# Patient Record
Sex: MALE | Race: WHITE | Employment: FULL TIME | URBAN - METROPOLITAN AREA
[De-identification: names, ages, dates, MRNs, and addresses within clinical notes are randomized per-mention and may not be internally consistent; named-entity substitution may affect disease eponyms.]

---

## 2017-11-17 ENCOUNTER — APPOINTMENT (OUTPATIENT)
Dept: CT IMAGING | Age: 57
DRG: 200 | End: 2017-11-17
Attending: EMERGENCY MEDICINE
Payer: COMMERCIAL

## 2017-11-17 ENCOUNTER — HOSPITAL ENCOUNTER (INPATIENT)
Age: 57
LOS: 3 days | Discharge: HOME | DRG: 200 | End: 2017-11-20
Attending: EMERGENCY MEDICINE | Admitting: HOSPITALIST
Payer: COMMERCIAL

## 2017-11-17 ENCOUNTER — APPOINTMENT (OUTPATIENT)
Dept: GENERAL RADIOLOGY | Age: 57
DRG: 200 | End: 2017-11-17
Attending: EMERGENCY MEDICINE
Payer: COMMERCIAL

## 2017-11-17 DIAGNOSIS — J94.2 HEMOPNEUMOTHORAX ON RIGHT: Primary | ICD-10-CM

## 2017-11-17 DIAGNOSIS — S22.41XA CLOSED FRACTURE OF MULTIPLE RIBS OF RIGHT SIDE, INITIAL ENCOUNTER: ICD-10-CM

## 2017-11-17 PROBLEM — S27.2XXA TRAUMATIC HEMOPNEUMOTHORAX: Status: ACTIVE | Noted: 2017-11-17

## 2017-11-17 LAB
ALBUMIN SERPL-MCNC: 3.4 G/DL (ref 3.5–5)
ALBUMIN/GLOB SERPL: 0.8 {RATIO} (ref 1.1–2.2)
ALP SERPL-CCNC: 65 U/L (ref 45–117)
ALT SERPL-CCNC: 36 U/L (ref 12–78)
ANION GAP SERPL CALC-SCNC: 8 MMOL/L (ref 5–15)
APPEARANCE UR: CLEAR
AST SERPL-CCNC: 33 U/L (ref 15–37)
BACTERIA URNS QL MICRO: NEGATIVE /HPF
BASOPHILS # BLD: 0 K/UL (ref 0–0.1)
BASOPHILS NFR BLD: 0 % (ref 0–1)
BILIRUB SERPL-MCNC: 1.5 MG/DL (ref 0.2–1)
BILIRUB UR QL CFM: NEGATIVE
BUN SERPL-MCNC: 9 MG/DL (ref 6–20)
BUN/CREAT SERPL: 14 (ref 12–20)
CALCIUM SERPL-MCNC: 8.5 MG/DL (ref 8.5–10.1)
CHLORIDE SERPL-SCNC: 101 MMOL/L (ref 97–108)
CO2 SERPL-SCNC: 27 MMOL/L (ref 21–32)
COLOR UR: ABNORMAL
CREAT SERPL-MCNC: 0.65 MG/DL (ref 0.7–1.3)
EOSINOPHIL # BLD: 0.1 K/UL (ref 0–0.4)
EOSINOPHIL NFR BLD: 1 % (ref 0–7)
EPITH CASTS URNS QL MICRO: ABNORMAL /LPF
ERYTHROCYTE [DISTWIDTH] IN BLOOD BY AUTOMATED COUNT: 13.5 % (ref 11.5–14.5)
GLOBULIN SER CALC-MCNC: 4.3 G/DL (ref 2–4)
GLUCOSE SERPL-MCNC: 100 MG/DL (ref 65–100)
GLUCOSE UR STRIP.AUTO-MCNC: NEGATIVE MG/DL
HCT VFR BLD AUTO: 41.5 % (ref 36.6–50.3)
HGB BLD-MCNC: 13.7 G/DL (ref 12.1–17)
HGB UR QL STRIP: NEGATIVE
INR PPP: 1 (ref 0.9–1.1)
KETONES UR QL STRIP.AUTO: 15 MG/DL
LEUKOCYTE ESTERASE UR QL STRIP.AUTO: ABNORMAL
LIPASE SERPL-CCNC: 116 U/L (ref 73–393)
LYMPHOCYTES # BLD: 1.5 K/UL (ref 0.8–3.5)
LYMPHOCYTES NFR BLD: 12 % (ref 12–49)
MCH RBC QN AUTO: 30.4 PG (ref 26–34)
MCHC RBC AUTO-ENTMCNC: 33 G/DL (ref 30–36.5)
MCV RBC AUTO: 92 FL (ref 80–99)
MONOCYTES # BLD: 1.5 K/UL (ref 0–1)
MONOCYTES NFR BLD: 12 % (ref 5–13)
MUCOUS THREADS URNS QL MICRO: ABNORMAL /LPF
NEUTS SEG # BLD: 9.3 K/UL (ref 1.8–8)
NEUTS SEG NFR BLD: 75 % (ref 32–75)
NITRITE UR QL STRIP.AUTO: NEGATIVE
PH UR STRIP: 6 [PH] (ref 5–8)
PLATELET # BLD AUTO: 278 K/UL (ref 150–400)
POTASSIUM SERPL-SCNC: 3.8 MMOL/L (ref 3.5–5.1)
PROT SERPL-MCNC: 7.7 G/DL (ref 6.4–8.2)
PROT UR STRIP-MCNC: ABNORMAL MG/DL
PROTHROMBIN TIME: 9.8 SEC (ref 9–11.1)
RBC # BLD AUTO: 4.51 M/UL (ref 4.1–5.7)
RBC #/AREA URNS HPF: ABNORMAL /HPF (ref 0–5)
SODIUM SERPL-SCNC: 136 MMOL/L (ref 136–145)
SP GR UR REFRACTOMETRY: 1.03 (ref 1–1.03)
UROBILINOGEN UR QL STRIP.AUTO: 1 EU/DL (ref 0.2–1)
WBC # BLD AUTO: 12.4 K/UL (ref 4.1–11.1)
WBC URNS QL MICRO: ABNORMAL /HPF (ref 0–4)

## 2017-11-17 PROCEDURE — 99284 EMERGENCY DEPT VISIT MOD MDM: CPT

## 2017-11-17 PROCEDURE — 74011636320 HC RX REV CODE- 636/320: Performed by: EMERGENCY MEDICINE

## 2017-11-17 PROCEDURE — 96361 HYDRATE IV INFUSION ADD-ON: CPT

## 2017-11-17 PROCEDURE — 77030019597 HC DRN CHST PLUER TELE -B

## 2017-11-17 PROCEDURE — 74011250636 HC RX REV CODE- 250/636: Performed by: EMERGENCY MEDICINE

## 2017-11-17 PROCEDURE — 74011000258 HC RX REV CODE- 258: Performed by: EMERGENCY MEDICINE

## 2017-11-17 PROCEDURE — 85025 COMPLETE CBC W/AUTO DIFF WBC: CPT | Performed by: EMERGENCY MEDICINE

## 2017-11-17 PROCEDURE — 36415 COLL VENOUS BLD VENIPUNCTURE: CPT | Performed by: EMERGENCY MEDICINE

## 2017-11-17 PROCEDURE — 96374 THER/PROPH/DIAG INJ IV PUSH: CPT

## 2017-11-17 PROCEDURE — 74011250636 HC RX REV CODE- 250/636

## 2017-11-17 PROCEDURE — 99153 MOD SED SAME PHYS/QHP EA: CPT

## 2017-11-17 PROCEDURE — 74177 CT ABD & PELVIS W/CONTRAST: CPT

## 2017-11-17 PROCEDURE — 99152 MOD SED SAME PHYS/QHP 5/>YRS: CPT

## 2017-11-17 PROCEDURE — 94761 N-INVAS EAR/PLS OXIMETRY MLT: CPT

## 2017-11-17 PROCEDURE — 83690 ASSAY OF LIPASE: CPT | Performed by: EMERGENCY MEDICINE

## 2017-11-17 PROCEDURE — 96375 TX/PRO/DX INJ NEW DRUG ADDON: CPT

## 2017-11-17 PROCEDURE — 80053 COMPREHEN METABOLIC PANEL: CPT | Performed by: EMERGENCY MEDICINE

## 2017-11-17 PROCEDURE — 85610 PROTHROMBIN TIME: CPT | Performed by: EMERGENCY MEDICINE

## 2017-11-17 PROCEDURE — 0W9930Z DRAINAGE OF RIGHT PLEURAL CAVITY WITH DRAINAGE DEVICE, PERCUTANEOUS APPROACH: ICD-10-PCS | Performed by: EMERGENCY MEDICINE

## 2017-11-17 PROCEDURE — 81001 URINALYSIS AUTO W/SCOPE: CPT | Performed by: EMERGENCY MEDICINE

## 2017-11-17 PROCEDURE — 74011000250 HC RX REV CODE- 250: Performed by: EMERGENCY MEDICINE

## 2017-11-17 PROCEDURE — C1729 CATH, DRAINAGE: HCPCS

## 2017-11-17 PROCEDURE — 71010 XR CHEST PORT: CPT

## 2017-11-17 PROCEDURE — 71260 CT THORAX DX C+: CPT

## 2017-11-17 PROCEDURE — 74011250637 HC RX REV CODE- 250/637: Performed by: HOSPITALIST

## 2017-11-17 PROCEDURE — 65270000032 HC RM SEMIPRIVATE

## 2017-11-17 PROCEDURE — 74011250636 HC RX REV CODE- 250/636: Performed by: HOSPITALIST

## 2017-11-17 PROCEDURE — 75810000165 HC THORACENTESIS

## 2017-11-17 RX ORDER — HYDROMORPHONE HYDROCHLORIDE 1 MG/ML
1 INJECTION, SOLUTION INTRAMUSCULAR; INTRAVENOUS; SUBCUTANEOUS
Status: COMPLETED | OUTPATIENT
Start: 2017-11-17 | End: 2017-11-17

## 2017-11-17 RX ORDER — DIPHENHYDRAMINE HCL 25 MG
25 CAPSULE ORAL
Status: DISCONTINUED | OUTPATIENT
Start: 2017-11-17 | End: 2017-11-20 | Stop reason: HOSPADM

## 2017-11-17 RX ORDER — ACETAMINOPHEN 325 MG/1
650 TABLET ORAL
Status: DISCONTINUED | OUTPATIENT
Start: 2017-11-17 | End: 2017-11-20 | Stop reason: HOSPADM

## 2017-11-17 RX ORDER — DOCUSATE SODIUM 100 MG/1
100 CAPSULE, LIQUID FILLED ORAL 2 TIMES DAILY
Status: DISCONTINUED | OUTPATIENT
Start: 2017-11-17 | End: 2017-11-18

## 2017-11-17 RX ORDER — SODIUM CHLORIDE 0.9 % (FLUSH) 0.9 %
10 SYRINGE (ML) INJECTION
Status: COMPLETED | OUTPATIENT
Start: 2017-11-17 | End: 2017-11-17

## 2017-11-17 RX ORDER — IBUPROFEN 200 MG
400 TABLET ORAL
COMMUNITY

## 2017-11-17 RX ORDER — HYDROMORPHONE HYDROCHLORIDE 1 MG/ML
1 INJECTION, SOLUTION INTRAMUSCULAR; INTRAVENOUS; SUBCUTANEOUS ONCE
Status: ACTIVE | OUTPATIENT
Start: 2017-11-17 | End: 2017-11-18

## 2017-11-17 RX ORDER — KETAMINE HYDROCHLORIDE 50 MG/ML
75 INJECTION, SOLUTION INTRAMUSCULAR; INTRAVENOUS AS NEEDED
Status: DISCONTINUED | OUTPATIENT
Start: 2017-11-17 | End: 2017-11-20 | Stop reason: HOSPADM

## 2017-11-17 RX ORDER — HYDROMORPHONE HYDROCHLORIDE 1 MG/ML
1 INJECTION, SOLUTION INTRAMUSCULAR; INTRAVENOUS; SUBCUTANEOUS
Status: DISCONTINUED | OUTPATIENT
Start: 2017-11-17 | End: 2017-11-17 | Stop reason: CLARIF

## 2017-11-17 RX ORDER — ONDANSETRON 2 MG/ML
4 INJECTION INTRAMUSCULAR; INTRAVENOUS
Status: DISCONTINUED | OUTPATIENT
Start: 2017-11-17 | End: 2017-11-20 | Stop reason: HOSPADM

## 2017-11-17 RX ORDER — ONDANSETRON 2 MG/ML
4 INJECTION INTRAMUSCULAR; INTRAVENOUS ONCE
Status: COMPLETED | OUTPATIENT
Start: 2017-11-17 | End: 2017-11-17

## 2017-11-17 RX ORDER — SODIUM CHLORIDE 9 MG/ML
1000 INJECTION, SOLUTION INTRAVENOUS ONCE
Status: COMPLETED | OUTPATIENT
Start: 2017-11-17 | End: 2017-11-17

## 2017-11-17 RX ORDER — LIDOCAINE HYDROCHLORIDE 10 MG/ML
0.2 INJECTION INFILTRATION; PERINEURAL
Status: COMPLETED | OUTPATIENT
Start: 2017-11-17 | End: 2017-11-17

## 2017-11-17 RX ORDER — OXYCODONE HYDROCHLORIDE 5 MG/1
5 TABLET ORAL
Status: DISCONTINUED | OUTPATIENT
Start: 2017-11-17 | End: 2017-11-20 | Stop reason: HOSPADM

## 2017-11-17 RX ORDER — SODIUM CHLORIDE 0.9 % (FLUSH) 0.9 %
5-10 SYRINGE (ML) INJECTION AS NEEDED
Status: DISCONTINUED | OUTPATIENT
Start: 2017-11-17 | End: 2017-11-20 | Stop reason: HOSPADM

## 2017-11-17 RX ORDER — SODIUM CHLORIDE 9 MG/ML
75 INJECTION, SOLUTION INTRAVENOUS CONTINUOUS
Status: DISCONTINUED | OUTPATIENT
Start: 2017-11-17 | End: 2017-11-18

## 2017-11-17 RX ORDER — HYDROMORPHONE HYDROCHLORIDE 1 MG/ML
INJECTION, SOLUTION INTRAMUSCULAR; INTRAVENOUS; SUBCUTANEOUS
Status: COMPLETED
Start: 2017-11-17 | End: 2017-11-17

## 2017-11-17 RX ORDER — SODIUM CHLORIDE 0.9 % (FLUSH) 0.9 %
5-10 SYRINGE (ML) INJECTION EVERY 8 HOURS
Status: DISCONTINUED | OUTPATIENT
Start: 2017-11-17 | End: 2017-11-20 | Stop reason: HOSPADM

## 2017-11-17 RX ORDER — HYDROMORPHONE HYDROCHLORIDE 2 MG/ML
1 INJECTION, SOLUTION INTRAMUSCULAR; INTRAVENOUS; SUBCUTANEOUS
Status: DISCONTINUED | OUTPATIENT
Start: 2017-11-17 | End: 2017-11-20 | Stop reason: HOSPADM

## 2017-11-17 RX ADMIN — Medication 10 ML: at 21:51

## 2017-11-17 RX ADMIN — IOPAMIDOL 100 ML: 755 INJECTION, SOLUTION INTRAVENOUS at 14:10

## 2017-11-17 RX ADMIN — OXYCODONE HYDROCHLORIDE 5 MG: 5 TABLET ORAL at 22:45

## 2017-11-17 RX ADMIN — SODIUM CHLORIDE 1000 ML: 900 INJECTION, SOLUTION INTRAVENOUS at 13:18

## 2017-11-17 RX ADMIN — LIDOCAINE HYDROCHLORIDE 0.2 ML: 10 INJECTION, SOLUTION INFILTRATION; PERINEURAL at 16:25

## 2017-11-17 RX ADMIN — Medication 10 ML: at 14:10

## 2017-11-17 RX ADMIN — HYDROMORPHONE HYDROCHLORIDE 1 MG: 1 INJECTION, SOLUTION INTRAMUSCULAR; INTRAVENOUS; SUBCUTANEOUS at 13:21

## 2017-11-17 RX ADMIN — SODIUM CHLORIDE 75 ML/HR: 900 INJECTION, SOLUTION INTRAVENOUS at 19:13

## 2017-11-17 RX ADMIN — HYDROMORPHONE HYDROCHLORIDE 1 MG: 1 INJECTION, SOLUTION INTRAMUSCULAR; INTRAVENOUS; SUBCUTANEOUS at 18:42

## 2017-11-17 RX ADMIN — Medication 10 ML: at 18:42

## 2017-11-17 RX ADMIN — ONDANSETRON 4 MG: 2 INJECTION INTRAMUSCULAR; INTRAVENOUS at 13:21

## 2017-11-17 RX ADMIN — HYDROMORPHONE HYDROCHLORIDE 1 MG: 1 INJECTION, SOLUTION INTRAMUSCULAR; INTRAVENOUS; SUBCUTANEOUS at 16:31

## 2017-11-17 RX ADMIN — DOCUSATE SODIUM 100 MG: 100 CAPSULE, LIQUID FILLED ORAL at 18:42

## 2017-11-17 RX ADMIN — SODIUM CHLORIDE 1000 ML: 900 INJECTION, SOLUTION INTRAVENOUS at 16:00

## 2017-11-17 RX ADMIN — KETAMINE HYDROCHLORIDE 75 MG: 50 INJECTION, SOLUTION INTRAMUSCULAR; INTRAVENOUS at 16:01

## 2017-11-17 RX ADMIN — SODIUM CHLORIDE 100 ML: 900 INJECTION, SOLUTION INTRAVENOUS at 14:10

## 2017-11-17 NOTE — CONSULTS
Asked to see Mr. Bazzi re: right hemopneumothorax    Mr. Trena Terrell is a 64 y/o gentleman without much past medical history, not followed closely medically, who presented to an urgent care today with right sided chest/flank pain. He reports he tripped on the sidewalk 2-3 days ago and fell and struck his right rib cage. The pain, which he describes as both sharp and a constant dull ache, has gotten progressively worse. He has difficulty taking deep breaths and he can't cough. He also feels tired and worn out. He denies any associated nausea or vomitting. In the ER he has undergone CXR and a Chest CT. He has gotten good pain relief with narcotics. Allergies: NKDA    PMHx: none    PSHx: LIHR, b/l knee arthroscopies    SocHx: non smoker, endorses etoh use [unclear amount]    FamHx: no history of pulmonary disorders    Meds: none reported    ROS:    Constitutional- tired  HEENT- deneis dysphagia  Neuro- denies syncope  Optho- no changes in vision  Resp- dyspneic  CV- denies angina  GI- denies abd pain   - no complaints  ID- denies recent fevers  Vasc- denies claudication    Afebrile  P 80  /60    On exam he is seated upright in bed  Somewhat disheveled in appearance  Malodorous  alert and oriented  Well developed  Normal affect, normal speech  Poor dentition  Not tachypneic, not cyanotic  Splinting  Diminished BS b/l  RRR, no murmurs  R chest wall tender to palpation, no obvious deformities  R inferior, lateral chest wall ecchymotic  Abd SNTND, no organomegaly  Radial pulses palp b/l  LE non edematous, no evidence of venous disease    ====================    WBC 12.4  Hgb 13.7  Plts 278    Cr 0.9    =====================    I have personally reviewed the CXR and Chest CT. He has a large R pneumothorax and a mod hemothorax with related compression of his RLL.   There are multiple fractures of his Right 5-10th ribs both anteriorly and posteriorly.      ========================    Diagnoses  1- Right hemopneumothorax  2- multiple displaced right rib fractures    =====================    Mr. Sarthak Morris has multiple, displaced traumatic fractures of his right ribs. He does not appear to have a flail segment. He has an associated hemopneumothorax. He needs a large bore right chest tube. Dr. Shelley Herrera is going to place the chest tube. He has a delayed presentation. He needs aggressive pain control and pulmonary toilet to include incentive spirometer, frequent ambulation and acupella valve. Chest tube should be kept to suction. My concern is that with his delayed presentation a chest tube may not fully evacuate his hemothorax. Will follow his CXRs and he may need a R VATS. At this time no indication for urgent rib plating. If his pain is not adequately controlled please consider having anesthesia place a thoracic epidural.    In reviewing his Chest CT and examining him, the extent of his injuries and his injury pattern are inconsistent with his story of tripping and having a ground level fall. Thank you for allowing us to care for Mr. Sarthak Morris.

## 2017-11-17 NOTE — H&P
[address] [place] [facility] [address]   HISTORY AND PHYSICAL       Name:  Cricket Cat   MR#:  [id number]   :  1960   Account #:  [de-identified]        Date of Adm:  2017       PRIMARY CARE PHYSICIAN: None. CHIEF COMPLAINT: Fall with injury. HISTORY OF PRESENT ILLNESS: The patient is a 16-year-old   gentleman with a little past medical history who presents after a fall this   past Tuesday where he injured himself. The patient states that on   Tuesday evening he was walking, tripped on the sidewalk and fell on   his right side. He landed on a concrete garden retaining wall. He   injured his side, but was able to stand himself up and walk home. He   did not strike his head, did not injure his legs or his hips or arm. He   noticed the next day, the pain in his right side was worse, especially   when he took a deep breath. He started to notice he got short of breath   when he moved and the pain got especially bad. He had some   ibuprofen at home, which he took which made things a little bit better,   but not much. Subsequently, the patient went to [facility] and was   asked to come to the emergency room for further evaluation. PAST MEDICAL HISTORY: None. MEDICATIONS:   1. Ibuprofen. 2. Multivitamin. ALLERGIES: NO KNOWN DRUG ALLERGIES. REVIEW OF SYSTEMS: The patient denies any injury to his abdomen,   no abdominal pain, nausea, vomiting, diarrhea. No head injury. He has   not had headaches, vision changes or tinnitus. Otherwise, 10-point   review of systems is negative except for as mentioned in HPI. FAMILY HISTORY: Reviewed for weak bones and is noncontributory. SOCIAL HISTORY: The patient lives with 2 roommates, does not   smoke, but he drinks 4-5 beers approximately 3 days a week, never   had any withdrawal from alcohol, or seizures. No illicit drugs.     PHYSICAL EXAMINATION   VITAL SIGNS: Temperature 36.4, pulse 86, blood pressure 126/98, respirations 20, oxygen saturation 97% on room air. GENERAL APPEARANCE: The patient is a normal-appearing   gentleman who appeared to be in pain. EYES: Pupils equal, round and reactive to light. Extraocular   movements intact. No scleral icterus. No conjunctival pallor. ENT: Mucous membranes are moist. Oropharynx is benign. NECK: Supple, no jugular venous distention, no thyromegaly. CARDIOVASCULAR: Heart sounds are mildly displaced to the right,   but no murmurs, rubs or gallops. RESPIRATORY: Lungs have decreased breath sounds in the entire   left chest, mostly decreased at the base. The patient is tachypneic   but no increased work of breathing. He is splinting his left arm to   protect his side chest wall. There is no large left ecchymosis or   deformity, but he is very tender to palpation. GI: Abdomen soft, nontender, nondistended with positive bowel   sounds. : No costovertebral angle tenderness. SKIN: No rashes, pallor or jaundice. MUSCULOSKELETAL: Muscle tone and bulk are normal. There is no   cyanosis, clubbing or edema. NEUROLOGIC: The patient is alert and oriented x3. Cranial nerves 2   through 12 intact. He is moving all 4 extremities without focal deficits. SIGNIFICANT LABORATORY DATA: White blood cell count 12.4,   hemoglobin 13.7, platelets 105. Sodium 136, potassium 3.8,   bicarbonate 27, BUN 9, creatinine 0.65, glucose 100. Bilirubin 1.5,   alkaline phosphatase 65, lipase 116. INR 1.0.      OTHER STUDIES: CT of the chest shows a small to moderate right   hemorrhagic pleural effusion and a moderate to large pneumothorax   on the right. There is a leftward deviation of the heart. CT of the   abdomen shows no acute intraabdominal process. ASSESSMENT: The patient is a 60-year-old gentleman who presents   with a hemopneumothorax and rib fractures after a fall. PROBLEMS:   1. Hemopneumothorax.  The patient's hemopneumothorax is likely due   to his rib fractures and trauma. He is going to have a chest tube   placed. Will provide pain control and Thoracic Surgery has been   consulted for management. 2. Multiple right rib fractures. The patient has multiple right rib fractures   that appear to be nondisplaced. We will provide pain control, incentive   spirometry, early mobilization as able. 3. Leukocytosis. The patient has mild leukocytosis, likely secondary to   his fractures and injury. We will continue to monitor. No other signs of   infection. 4. Elevated bilirubin. The patient appears a little dehydrated on exam.   There is no hepatobiliary pathology on his CT of his abdomen. We will   continue to monitor this. 5. Alcohol consumption. The patient does admit to drinking 4-5 beers 3   days a week. I suspect that he was probably drunk when he fell over. No history of withdrawal but I think we can keep a close eye on him in   the hospital and institute the [facility] protocol as needed.         Sahra Anderson MD      WT / CARLITA   D:  11/17/2017   15:51   T:  11/17/2017   16:27   Job #:  [id number]

## 2017-11-17 NOTE — ROUTINE PROCESS
TRANSFER - IN REPORT:    Verbal report received from [address] [place] [address] RN(name) on Holmes Opitz  being received from ED(unit) for routine progression of care      Report consisted of patients Situation, Background, Assessment and   Recommendations(SBAR). Information from the following report(s) SBAR was reviewed with the receiving nurse. Opportunity for questions and clarification was provided. Assessment completed upon patients arrival to unit and care assumed.

## 2017-11-17 NOTE — IP AVS SNAPSHOT
[address] 
[phone] Patient: Nadiya Villalta MRN: [id number] TVM:3/95/1516 About your hospitalization You were admitted on:  November 17, 2017 You last received care in the:  [place] [address] You were discharged on:  November 20, 2017 Why you were hospitalized Your primary diagnosis was:  Traumatic Hemopneumothorax Things You Need To Do (next 8 weeks) Schedule an appointment with Mariposa Gillespie MD as soon as possible for a visit in 1 week(s) Phone:  [phone] Where:  [address] [place] [address], [address] [place] [id number] Discharge Orders None A check katelyn indicates which time of day the medication should be taken. My Medications TAKE these medications as instructed Instructions Each Dose to Equal  
 Morning Noon Evening Bedtime  
 ibuprofen 200 mg tablet Commonly known as:  MOTRIN Your last dose was: Your next dose is: Take 400 mg by mouth every eight (8) hours as needed for Pain. 400 mg  
    
   
   
   
  
 multivitamin tablet Commonly known as:  ONE A DAY Your last dose was: Your next dose is: Take 1 Tab by mouth daily. 1 Tab  
    
   
   
   
  
 oxyCODONE IR 5 mg immediate release tablet Commonly known as:  Don Cj Your last dose was: Your next dose is: Take 1 Tab by mouth every six (6) hours as needed. Max Daily Amount: 20 mg. For pain  
 5 mg  
    
   
   
   
  
 senna-docusate 8.6-50 mg per tablet Commonly known as:  Chau Welsh Start taking on:  11/21/2017 Your last dose was: Your next dose is: Take 1 Tab by mouth daily. 1 Tab Where to Get Your Medications Information on where to get these meds will be given to you by the nurse or doctor. ! Ask your nurse or doctor about these medications  
  oxyCODONE IR 5 mg immediate release tablet  
 senna-docusate 8.6-50 mg per tablet Discharge Instructions Discharge Instructions PATIENT ID: Jose M Gil MRN: [id number] YOB: 1960 DATE OF ADMISSION: 11/17/2017 12:26 PM   
DATE OF DISCHARGE: 11/20/2017 PRIMARY CARE PROVIDER: PROVIDER UNKNOWN  
 
ATTENDING PHYSICIAN: Jono Blackburn MD 
DISCHARGING PROVIDER: Jono Blackburn MD   
To contact this individual call [phone] and ask the  to page. If unavailable ask to be transferred the Adult Hospitalist Department. DISCHARGE DIAGNOSES hemopneumothorax, rib fractures CONSULTATIONS: IP CONSULT TO THORACIC SURGERY 
 
PROCEDURES/SURGERIES: * No surgery found * PENDING TEST RESULTS:  
At the time of discharge the following test results are still pending: n/a FOLLOW UP APPOINTMENTS:  
Follow-up Information Follow up With Details Comments Contact Info Elena Marie MD Schedule an appointment as soon as possible for a visit in 1 week  [address] [place] [address], [address] [place] [address] [address] [place] [address] 
[phone] ADDITIONAL CARE RECOMMENDATIONS:  
You were admitted with a collapsed lung and bleeding around the lung. This has resolved. You need to follow-up with your sodctor for a repeat chest x-ray to ensure everything is stable. See additional instructions below. DIET: Regular Diet DISCHARGE MEDICATIONS: 
 See Medication Reconciliation Form · It is important that you take the medication exactly as they are prescribed. · Keep your medication in the bottles provided by the pharmacist and keep a list of the medication names, dosages, and times to be taken in your wallet. · Do not take other medications without consulting your doctor. NOTIFY YOUR PHYSICIAN FOR ANY OF THE FOLLOWING:  
Fever over 101 degrees for 24 hours. Chest pain, shortness of breath, fever, chills, nausea, vomiting, diarrhea, change in mentation, falling, weakness, bleeding. Severe pain or pain not relieved by medications. Or, any other signs or symptoms that you may have questions about. DISPOSITION: 
x  Home With: 
 OT  PT  [place]  RN  
  
 SNF/Inpatient Rehab/LTAC Independent/assisted living Hospice Other: CDMP Checked:  
Yes x PROBLEM LIST Updated: 
Yes x Signed:  
Leticia Nolan MD 
11/20/2017 DISCHARGE INSTRUCTIONS AFTER PNEUMOTHORAX [address] [facility] [address] [facility] [address] Leave the dressing in place for 48 hours For relief of discomfort: ? Take the pain medicine you were given at discharge ? Aleve one or two tablets every 12 hrs (with food) along with pain medicine (this can be purchased over the counter at your local pharmacy/drug store) Advil may be substituted. COUGHING: 
 Coughing is helpful ACTIVITY: 
 Exercise has many benefits. It promotes healing, expands your lungs, helps you cough, relaxes your body, tones muscles, lowers blood pressure, and increases your appetite. DO: 
1. Walk. Walking is the preferred mode of exercise. Walk everyday and increase the distance each day. Walk outside if weather permits. 2. Climb stairs 3. Ride in a car 4. Perform breathing exercises DO NOT: 
1. Lift heavy objects (8-10) 2. Do heavy yard or housework CALL THE OFFICE IF YOU DEVELOP: 
 
? Increasing pain that is not controlled by pain medicine ? Increasing redness or drainage around the incision ? Unusual or increasing shortness of breath ? Fever greater than 101 degrees ? Change in the color of your sputum to yellow or green, especially if you are also have increasing shortness of breath and a fever greater than 101.  
 
YOUR MEDICATIONS: 
 
 
 
FOLLOW-UP APPOINTMENT: 
 AFTER DISCHARGE CALL THE OFFICE TO SCHEDULE YOUR VISIT TO SEE US IN 1 Week. You will need a chest x ray at the time of your visit Physician Signature MyChart Announcement We are excited to announce that we are making your provider's discharge notes available to you in [employer]. You will see these notes when they are completed and signed by the physician that discharged you from your recent hospital stay. If you have any questions or concerns about any information you see in [place] [employer], please call the Health Information Department where you were seen or reach out to your Primary Care Provider for more information about your plan of care. Introducing [facility] [place] [facility]! [facility] introduces [employer] patient portal. Now you can access parts of your medical record, email your doctor's office, and request medication refills online. 1. In your internet browser, go to [email or web address] 2. Click on the First Time User? Click Here link in the Sign In box. You will see the New Member Sign Up page. 3. Enter your [employer] Access Code exactly as it appears below. You will not need to use this code after youve completed the sign-up process. If you do not sign up before the expiration date, you must request a new code. · [employer] Access Code: [email or web address] Expires: 2/18/2018  2:29 PM 
 
4. Enter the last four digits of your Social Security Number (xxxx) and Date of Birth (mm/dd/yyyy) as indicated and click Submit. You will be taken to the next sign-up page. 5. Create a [employer] RF[employer] ID. This will be your [employer] login ID and cannot be changed, so think of one that is secure and easy to remember. 6. Create a [employer] password. You can change your password at any time. 7. Enter your Password Reset Question and Answer. This can be used at a later time if you forget your password. 8. Enter your e-mail address.  You will receive e-mail notification when new information is available in [email or web address]. 9. Click Sign Up. You can now view and download portions of your medical record. 10. Click the Download Summary menu link to download a portable copy of your medical information. If you have questions, please visit the Frequently Asked Questions section of the [email or web address] website. Remember, [email or web address] is NOT to be used for urgent needs. For medical emergencies, dial 911. Now available from your iPhone and Android! Providers Seen During Your Hospitalization Provider Specialty Primary office phone Satya Mayen DO Emergency Medicine [phone] Chavez Olsen MD Internal Medicine [phone] Bing Armendariz MD Hospitalist [phone] Immunizations Administered for This Admission Name Date Influenza Vaccine (Quad) PF  Deferred () Your Primary Care Physician (PCP) Primary Care Physician Office Phone Office Fax UNKNOWN, PROVIDER ** None ** ** None ** You are allergic to the following No active allergies Recent Documentation Height Weight BMI Smoking Status 1.778 m 68.5 kg 21.67 kg/m2 Never Smoker Emergency Contacts Name Discharge Info Relation Home Work Mobile Cari Bazziyce DISCHARGE CAREGIVER [3] Parent [1] [phone] Patient Belongings The following personal items are in your possession at time of discharge: 
  Dental Appliances: None  Visual Aid: Glasses, With patient      Home Medications: None   Jewelry: Necklace  Clothing: At bedside    Other Valuables: Cell Phone, Eyeglasses Please provide this summary of care documentation to your next provider. Signatures-by signing, you are acknowledging that this After Visit Summary has been reviewed with you and you have received a copy. Patient Signature:  ____________________________________________________________ Date:  ____________________________________________________________  
  
Scharlene Alosa Provider Signature:  ____________________________________________________________ Date:  ____________________________________________________________

## 2017-11-17 NOTE — ROUTINE PROCESS
TRANSFER - OUT REPORT:    Verbal report given to Helene Bhatti RN(name) on Baldev Staley  being transferred to [facility](unit) for routine progression of care       Report consisted of patients Situation, Background, Assessment and   Recommendations(SBAR). Information from the following report(s) SBAR and Kardex was reviewed with the receiving nurse. Lines:   Peripheral IV 11/17/17 Right Antecubital (Active)   Site Assessment Clean, dry, & intact 11/17/2017  2:00 PM   Phlebitis Assessment 0 11/17/2017  2:00 PM   Infiltration Assessment 0 11/17/2017  2:00 PM   Dressing Status Clean, dry, & intact 11/17/2017  2:00 PM   Hub Color/Line Status Pink 11/17/2017  2:00 PM        Opportunity for questions and clarification was provided.       Patient transported with:   [employer]

## 2017-11-17 NOTE — ED PROVIDER NOTES
HPI Comments: 66 yo male presents to ER for evaluation of right chest wall pain. Pt was walking on Tuesday night, 3 nights ago when he tripped and fell. Pt fell forward and struck raised concrete garden barrier with side of chest. Pt reports constant right sharp burning pain to chest wall. It does not radiate. Pain made worse with deep breaths, movements, or cough. No fever, chills, nausea, vomiting. No abdominal pain. No shortness of breath or difficulty breathing. He denies injury to head or neck. No LOC. No back pain. No alleviating factors. [facility] for further evaluation. Pain currently 8/10. Pt received demerol and phenergan prior to [facility] in [facility].     Social hx  Nonsmoker  Occasional alcohol    Patient is a 65 y.o. male presenting with fall. The history is provided by the patient. Fall   Pertinent negatives include no numbness, no abdominal pain, no nausea, no vomiting and no headaches. No past medical history on file. Past Surgical History:   Procedure Laterality Date    HX HERNIA REPAIR      left    HX KNEE ARTHROSCOPY      right    HX ORTHOPAEDIC      ACL ligament repair times two    HX TONSILLECTOMY           No family history on file. Social History     Social History    Marital status: SINGLE     Spouse name: N/A    Number of children: N/A    Years of education: N/A     Occupational History    Not on file. Social History Main Topics    Smoking status: Never Smoker    Smokeless tobacco: Never Used    Alcohol use Yes      Comment: weekends    Drug use: No    Sexual activity: Not on file     Other Topics Concern    Not on file     Social History Narrative    No narrative on file         ALLERGIES: Review of patient's allergies indicates no known allergies. Review of Systems   Constitutional: Negative for chills and fatigue. HENT: Negative for trouble swallowing. Eyes: Negative for visual disturbance.    Respiratory: Negative for cough and shortness of breath. Cardiovascular: Positive for chest pain (chest wall). Negative for palpitations. Gastrointestinal: Negative for abdominal pain, nausea and vomiting. Genitourinary: Negative for flank pain. Musculoskeletal: Negative for arthralgias, back pain, joint swelling, myalgias, neck pain and neck stiffness. Skin: Negative for color change and wound. Neurological: Negative for dizziness, weakness, light-headedness, numbness and headaches. All other systems reviewed and are negative. Vitals:    11/17/17 1225   BP: (!) 126/98   Pulse: 86   Resp: 20   Temp: 97.5 °F (36.4 °C)   SpO2: 97%   Weight: 68.5 kg (151 lb)   Height: 5' 10\" (1.778 m)            Physical Exam   Constitutional: He is oriented to person, place, and time. He appears well-developed and well-nourished. HENT:   Head: Normocephalic and atraumatic. Right Ear: External ear normal.   Left Ear: External ear normal.   Nose: Nose normal.   Mouth/Throat: Oropharynx is clear and moist.   Eyes: Conjunctivae and EOM are normal. Pupils are equal, round, and reactive to light. Right eye exhibits no discharge. Left eye exhibits no discharge. Neck: Normal range of motion. Neck supple. No cervical midline tenderness to palpation of cspine. No stepoffs, no deformity, no edema, no ecchymosis. NO midline pain with FROM of neck. Cardiovascular: Normal rate and regular rhythm. Pulmonary/Chest: Effort normal. No respiratory distress. He exhibits tenderness. Chest exposed:  Pt tender along right lateral and posterior chest wall/  Ecchymosis present  Crepitus present    Decreased breath sound right upper lobe. Abdominal: Soft. Normal appearance and bowel sounds are normal. He exhibits no distension and no mass. There is no hepatosplenomegaly, splenomegaly or hepatomegaly. There is no tenderness.  There is no rigidity, no rebound, no guarding, no CVA tenderness, no tenderness at McBurney's point and negative Edmondson's sign. Abdomen exposed for exam.  Soft, no pain to palpation to all 4 quadrants   Musculoskeletal: Normal range of motion. He exhibits no edema or tenderness. NO TLS spine pain with palpation. No edema, no ecchymosis, no redness or warmth. 5/5  strength bilaterally  5/5 flexion/extension of hips bilaterally   Neurological: He is alert and oriented to person, place, and time. He has normal reflexes. No cranial nerve deficit. He exhibits normal muscle tone. Coordination normal.   Skin: Skin is warm and dry. No rash noted. No erythema. Psychiatric: He has a normal mood and affect. His behavior is normal. Judgment and thought content normal.   Nursing note and vitals reviewed. MDM  Number of Diagnoses or Management Options  Closed fracture of multiple ribs of right side, initial encounter:   Hemopneumothorax on right:   Diagnosis management comments: 66 yo male presenting for right side chest wall pain s/p reported fall. Ecchymosis and tenderness present. Decreased breath sounds  [facility]. Will get CT and labs, pain control. 2:50 PM  CT with hemopneumothorax and multiple rib fractures. Discussed with patient and informed of admission. 3:00 PM  Pt case including HPI, PE, and all available lab and radiology results has been discussed with Dr. Trudy Doshi thoracic surgery. He will follow on floor. He asks for hospital to admit. He would like 28 [place] chest tube. 3:11 PM  Pt case including HPI, PE, and all available lab and radiology results has been discussed with Dr. Alejandra Pedersen for admission. ED Course       Procedures    Pt has been seen and evaluated by attending physician who has reviewed lab work and imaging tests.

## 2017-11-17 NOTE — ED TRIAGE NOTES
Reports falling Tuesday night onto right side. [facility] for continued pain. +multiple rib fractures, worried about pneumothorax. +difficulty breathing with movement. Denies dyspnea with rest.  Denies LOC or hitting head.

## 2017-11-17 NOTE — PROGRESS NOTES
Admission Medication Reconciliation:    Information obtained from: Patient     Significant PMH/Disease States:   No past medical history on file. Chief Complaint for this Admission:  Fall, abnormal CXR    Allergies:  Review of patient's allergies indicates no known allergies. Prior to Admission Medications:   Prior to Admission Medications   Prescriptions Last Dose Informant Patient Reported? Taking?   ibuprofen (MOTRIN) 200 mg tablet 11/17/2017 at Unknown time  Yes Yes   Sig: Take 400 mg by mouth every eight (8) hours as needed for Pain.   multivitamin (ONE A DAY) tablet 11/10/2017 at Unknown time  Yes Yes   Sig: Take 1 Tab by mouth daily. Facility-Administered Medications: None         Comments/Recommendations: Medication list confirmed with patient by pharmacy.   No changes were made    Carine Acharya, RonaldD

## 2017-11-17 NOTE — ED NOTES
Procedure Note - Thoracostomy/Chest Tube:   Performed by Maya Calderón DO . Obtained informed consent. Immediately prior to the procedure, the patient was reevaluated and found suitable for the planned procedure and any planned medications. Immediately prior to the procedure a time out was called to verify the correct patient, procedure, equipment, staff, and marking as appropriate. Site prepped with Betadine. Anesthesia was obtained with 1% lidocaine. A mid-axillary incision was made in the right chest and extended down to the intercostal muscles. A abbey clamp was used to penetrate the chest wall and parietal pleura superior to rib. A finger was inserted and confirmed that the lung was free from the chest wall. A 28 Fr. thoracostomy tube was inserted with trochar and secured in place in the usual manner. The procedure was tolerated well. Follow up chest x-ray was ordered. 200 cc blood out. [facility]     Name: Chu Dupree  Date:   11/17/2017    Procedure Details:    Immediately prior to the procedure, the patient was reevaluated and found suitable for the planned procedure and any planned medications. Immediately prior to the procedure a time out was called to verify the correct patient, procedure, equipment, staff, and marking as appropriate. Procedural sedation has been advised for this patient associated with chest tube insertion. The risks, benefits, and alternatives have been explained to the patient and He consents to the sedation. The patient last ate >12 hrs ago ago. The ASA score is ASA 2 - Mild systemic disease. The patient is having all vital signs monitored by an attending RN as well as pulse oximetry and end tidal C02 monitoring. Mallampati Score Reference: The patient has a patent airway  With a Mallampati Classification Score of I (soft palate, uvula, fauces, tonsillar pillars visible).     The patient was sedated with ketamine/KETOLAR and hydromorphone/DILAUDID. Reversal agents and resuscitation equipment were at the bedside. The chest tube was done and the patient tolerated the procedure well with no complications. Reversal agents were not used. Time start:  4:01    Time end:  4:30 pm       Signed By: DO ADAM Ortiz performed sedation and chest tube insertion.   Kyle Blake DO

## 2017-11-18 ENCOUNTER — APPOINTMENT (OUTPATIENT)
Dept: GENERAL RADIOLOGY | Age: 57
DRG: 200 | End: 2017-11-18
Attending: NURSE PRACTITIONER
Payer: COMMERCIAL

## 2017-11-18 LAB
ANION GAP SERPL CALC-SCNC: 5 MMOL/L (ref 5–15)
BUN SERPL-MCNC: 7 MG/DL (ref 6–20)
BUN/CREAT SERPL: 14 (ref 12–20)
CALCIUM SERPL-MCNC: 8.4 MG/DL (ref 8.5–10.1)
CHLORIDE SERPL-SCNC: 104 MMOL/L (ref 97–108)
CO2 SERPL-SCNC: 29 MMOL/L (ref 21–32)
CREAT SERPL-MCNC: 0.51 MG/DL (ref 0.7–1.3)
ERYTHROCYTE [DISTWIDTH] IN BLOOD BY AUTOMATED COUNT: 13.6 % (ref 11.5–14.5)
GLUCOSE SERPL-MCNC: 100 MG/DL (ref 65–100)
HCT VFR BLD AUTO: 35.6 % (ref 36.6–50.3)
HGB BLD-MCNC: 11.5 G/DL (ref 12.1–17)
MCH RBC QN AUTO: 30 PG (ref 26–34)
MCHC RBC AUTO-ENTMCNC: 32.3 G/DL (ref 30–36.5)
MCV RBC AUTO: 93 FL (ref 80–99)
PLATELET # BLD AUTO: 237 K/UL (ref 150–400)
POTASSIUM SERPL-SCNC: 4.1 MMOL/L (ref 3.5–5.1)
RBC # BLD AUTO: 3.83 M/UL (ref 4.1–5.7)
SODIUM SERPL-SCNC: 138 MMOL/L (ref 136–145)
WBC # BLD AUTO: 10.2 K/UL (ref 4.1–11.1)

## 2017-11-18 PROCEDURE — 74011250637 HC RX REV CODE- 250/637: Performed by: HOSPITALIST

## 2017-11-18 PROCEDURE — 74011250636 HC RX REV CODE- 250/636: Performed by: HOSPITALIST

## 2017-11-18 PROCEDURE — 77010033678 HC OXYGEN DAILY

## 2017-11-18 PROCEDURE — 71010 XR CHEST PORT: CPT

## 2017-11-18 PROCEDURE — 85027 COMPLETE CBC AUTOMATED: CPT | Performed by: HOSPITALIST

## 2017-11-18 PROCEDURE — 36415 COLL VENOUS BLD VENIPUNCTURE: CPT | Performed by: HOSPITALIST

## 2017-11-18 PROCEDURE — 80048 BASIC METABOLIC PNL TOTAL CA: CPT | Performed by: HOSPITALIST

## 2017-11-18 PROCEDURE — 74011000250 HC RX REV CODE- 250: Performed by: HOSPITALIST

## 2017-11-18 PROCEDURE — 65270000029 HC RM PRIVATE

## 2017-11-18 RX ORDER — FACIAL-BODY WIPES
10 EACH TOPICAL DAILY PRN
Status: DISCONTINUED | OUTPATIENT
Start: 2017-11-18 | End: 2017-11-20 | Stop reason: HOSPADM

## 2017-11-18 RX ORDER — AMOXICILLIN 250 MG
1 CAPSULE ORAL DAILY
Status: DISCONTINUED | OUTPATIENT
Start: 2017-11-18 | End: 2017-11-20 | Stop reason: HOSPADM

## 2017-11-18 RX ORDER — SORBITOL SOLUTION 70 %
30 SOLUTION, ORAL MISCELLANEOUS DAILY PRN
Status: DISCONTINUED | OUTPATIENT
Start: 2017-11-18 | End: 2017-11-20 | Stop reason: HOSPADM

## 2017-11-18 RX ORDER — LIDOCAINE 50 MG/G
1 PATCH TOPICAL EVERY 24 HOURS
Status: DISCONTINUED | OUTPATIENT
Start: 2017-11-18 | End: 2017-11-20 | Stop reason: HOSPADM

## 2017-11-18 RX ADMIN — OXYCODONE HYDROCHLORIDE 5 MG: 5 TABLET ORAL at 16:25

## 2017-11-18 RX ADMIN — FOLIC ACID: 5 INJECTION, SOLUTION INTRAMUSCULAR; INTRAVENOUS; SUBCUTANEOUS at 12:38

## 2017-11-18 RX ADMIN — DOCUSATE SODIUM 100 MG: 100 CAPSULE, LIQUID FILLED ORAL at 09:14

## 2017-11-18 RX ADMIN — Medication 10 ML: at 22:29

## 2017-11-18 RX ADMIN — HYDROMORPHONE HYDROCHLORIDE 1 MG: 2 INJECTION, SOLUTION INTRAMUSCULAR; INTRAVENOUS; SUBCUTANEOUS at 04:31

## 2017-11-18 RX ADMIN — OXYCODONE HYDROCHLORIDE 5 MG: 5 TABLET ORAL at 09:05

## 2017-11-18 RX ADMIN — SORBITOL 30 ML: 258.2 SOLUTION ORAL at 12:41

## 2017-11-18 RX ADMIN — OXYCODONE HYDROCHLORIDE 5 MG: 5 TABLET ORAL at 20:59

## 2017-11-18 RX ADMIN — Medication 10 ML: at 07:18

## 2017-11-18 RX ADMIN — DOCUSATE SODIUM AND SENNOSIDES 1 TABLET: 8.6; 5 TABLET, FILM COATED ORAL at 12:37

## 2017-11-18 NOTE — PROGRESS NOTES
Primary Nurse Roxann Dela Cruz RN and Shahid Davalos RN performed a dual skin assessment on this patient Impairment noted- see wound doc flow sheet  Rip score is 20

## 2017-11-18 NOTE — PROGRESS NOTES
[facility]  [address]  ____________________________________________________________      Admit Date: 2017    POD/HD2 for right hemothorax/rib fractures    Procedure:  Right chest tube    Subjective:     Patient has complaints of pain. Objective:     Blood pressure 114/68, pulse 81, temperature 98.6 °F (37 °C), resp. rate 18, height 5' 10\" (1.778 m), weight 151 lb (68.5 kg), SpO2 96 %. Temp (24hrs), Av.7 °F (37.1 °C), Min:97.5 °F (36.4 °C), Max:99.6 °F (37.6 °C)        Date 17 - 17 0659   Shift 7821-8416 1611-5674 2049-6704 24 Hour Total   I  N  T  A  K  E   Shift Total  (mL/kg)       O  U  T  P  U  T   Urine  (mL/kg/hr) 420   420    Chest Tube 80   80    Shift Total  (mL/kg) 500  (7.3)   500  (7.3)   Weight (kg) 68.5 68.5 68.5 68.5         Date 17 - 17 0659   Shift [id number] 3603-8093 0050-6972 24 Hour Total   I  N  T  A  K  E   Shift Total  (mL/kg)       O  U  T  P  U  T   Urine  (mL/kg/hr) 420   420    Chest Tube 80   80    Shift Total  (mL/kg) 500  (7.3)   500  (7.3)   Weight (kg) 68.5 68.5 68.5 68.5         Physical Exam:  GENERAL: alert, cooperative, no distress, LUNG: diminished breath sounds R base, HEART: regular rate and rhythm, S1, S2 normal, no murmur, click, rub or gallop    Incision:no incision    Chest Tube: no airleak 450 cc since placing chest tube    Labs:   Recent Results (from the past 24 hour(s))   CBC WITH AUTOMATED DIFF    Collection Time: 17 12:43 PM   Result Value Ref Range    WBC 12.4 (H) 4.1 - 11.1 K/uL    RBC 4.51 4.10 - 5.70 M/uL    HGB 13.7 12.1 - 17.0 g/dL    HCT 41.5 36.6 - 50.3 %    MCV 92.0 80.0 - 99.0 FL    MCH 30.4 26.0 - 34.0 PG    MCHC 33.0 30.0 - 36.5 g/dL    RDW 13.5 11.5 - 14.5 %    PLATELET 547 387 - 626 K/uL    NEUTROPHILS 75 32 - 75 %    LYMPHOCYTES 12 12 - 49 %    MONOCYTES 12 5 - 13 %    EOSINOPHILS 1 0 - 7 %    BASOPHILS 0 0 - 1 %    ABS. NEUTROPHILS 9.3 (H) 1.8 - 8.0 K/UL    ABS. LYMPHOCYTES 1.5 0.8 - 3.5 K/UL    ABS. MONOCYTES 1.5 (H) 0.0 - 1.0 K/UL    ABS. EOSINOPHILS 0.1 0.0 - 0.4 K/UL    ABS. BASOPHILS 0.0 0.0 - 0.1 K/UL   METABOLIC PANEL, COMPREHENSIVE    Collection Time: 11/17/17 12:43 PM   Result Value Ref Range    Sodium 136 136 - 145 mmol/L    Potassium 3.8 3.5 - 5.1 mmol/L    Chloride 101 97 - 108 mmol/L    CO2 27 21 - 32 mmol/L    Anion gap 8 5 - 15 mmol/L    Glucose 100 65 - 100 mg/dL    BUN 9 6 - 20 MG/DL    Creatinine 0.65 (L) 0.70 - 1.30 MG/DL    BUN/Creatinine ratio 14 12 - 20      GFR est AA >60 >60 ml/min/1.73m2    GFR est non-AA >60 >60 ml/min/1.73m2    Calcium 8.5 8.5 - 10.1 MG/DL    Bilirubin, total 1.5 (H) 0.2 - 1.0 MG/DL    ALT (SGPT) 36 12 - 78 U/L    AST (SGOT) 33 15 - 37 U/L    Alk.  phosphatase 65 45 - 117 U/L    Protein, total 7.7 6.4 - 8.2 g/dL    Albumin 3.4 (L) 3.5 - 5.0 g/dL    Globulin 4.3 (H) 2.0 - 4.0 g/dL    A-G Ratio 0.8 (L) 1.1 - 2.2     LIPASE    Collection Time: 11/17/17 12:43 PM   Result Value Ref Range    Lipase 116 73 - 393 U/L   PROTHROMBIN TIME + INR    Collection Time: 11/17/17 12:43 PM   Result Value Ref Range    INR 1.0 0.9 - 1.1      Prothrombin time 9.8 9.0 - 11.1 sec   URINALYSIS W/ RFLX MICROSCOPIC    Collection Time: 11/17/17  1:12 PM   Result Value Ref Range    Color DARK YELLOW      Appearance CLEAR CLEAR      Specific gravity 1.027 1.003 - 1.030      pH (UA) 6.0 5.0 - 8.0      Protein TRACE (A) NEG mg/dL    Glucose NEGATIVE  NEG mg/dL    Ketone 15 (A) NEG mg/dL    Blood NEGATIVE  NEG      Urobilinogen 1.0 0.2 - 1.0 EU/dL    Nitrites NEGATIVE  NEG      Leukocyte Esterase SMALL (A) NEG      WBC 0-4 0 - 4 /hpf    RBC 0-5 0 - 5 /hpf    Epithelial cells FEW FEW /lpf    Bacteria NEGATIVE  NEG /hpf    Mucus 1+ (A) NEG /lpf   BILIRUBIN, CONFIRM    Collection Time: 11/17/17  1:12 PM   Result Value Ref Range    Bilirubin UA, confirm NEGATIVE  NEG     METABOLIC PANEL, BASIC    Collection Time: 11/18/17  3:39 AM   Result Value Ref Range    Sodium 138 136 - 145 mmol/L    Potassium 4.1 3.5 - 5.1 mmol/L    Chloride 104 97 - 108 mmol/L    CO2 29 21 - 32 mmol/L    Anion gap 5 5 - 15 mmol/L    Glucose 100 65 - 100 mg/dL    BUN 7 6 - 20 MG/DL    Creatinine 0.51 (L) 0.70 - 1.30 MG/DL    BUN/Creatinine ratio 14 12 - 20      GFR est AA >60 >60 ml/min/1.73m2    GFR est non-AA >60 >60 ml/min/1.73m2    Calcium 8.4 (L) 8.5 - 10.1 MG/DL   CBC W/O DIFF    Collection Time: 11/18/17  3:39 AM   Result Value Ref Range    WBC 10.2 4.1 - 11.1 K/uL    RBC 3.83 (L) 4.10 - 5.70 M/uL    HGB 11.5 (L) 12.1 - 17.0 g/dL    HCT 35.6 (L) 36.6 - 50.3 %    MCV 93.0 80.0 - 99.0 FL    MCH 30.0 26.0 - 34.0 PG    MCHC 32.3 30.0 - 36.5 g/dL    RDW 13.6 11.5 - 14.5 %    PLATELET 583 761 - 821 K/uL       Data Review images and reports reviewed chest CXR improving effusion and PTX    Assessment:     Principal Problem:    Traumatic hemopneumothorax (11/17/2017)        Plan/Recommendations/Medical Decision Making:     Continue chest tube to suction for now   Will AM CXR   Aggressive chest PT   Ensure that patient has adequate pain control     Thank you for allowing us to participate in the care of your patient.     Maria E Rosario MD

## 2017-11-18 NOTE — PROGRESS NOTES
Bedside shift change report given to [address] [place] [address] (oncoming nurse) by Severiano Bowens RN (offgoing nurse). Report included the following information SBAR, Kardex, Procedure Summary, Intake/Output, MAR, Accordion and Recent Results.

## 2017-11-18 NOTE — ROUTINE PROCESS
Bedside and Verbal shift change report given to [facility] [id number] (oncoming nurse) by Jameel Candelario (offgoing nurse). Report included the following information SBAR, Kardex, Intake/Output, MAR, Accordion and Recent Results. All opportunity for clarification/questions given.

## 2017-11-19 ENCOUNTER — APPOINTMENT (OUTPATIENT)
Dept: GENERAL RADIOLOGY | Age: 57
DRG: 200 | End: 2017-11-19
Attending: THORACIC SURGERY (CARDIOTHORACIC VASCULAR SURGERY)
Payer: COMMERCIAL

## 2017-11-19 LAB
ANION GAP SERPL CALC-SCNC: 8 MMOL/L (ref 5–15)
BUN SERPL-MCNC: 5 MG/DL (ref 6–20)
BUN/CREAT SERPL: 11 (ref 12–20)
CALCIUM SERPL-MCNC: 8.7 MG/DL (ref 8.5–10.1)
CHLORIDE SERPL-SCNC: 104 MMOL/L (ref 97–108)
CO2 SERPL-SCNC: 27 MMOL/L (ref 21–32)
CREAT SERPL-MCNC: 0.45 MG/DL (ref 0.7–1.3)
ERYTHROCYTE [DISTWIDTH] IN BLOOD BY AUTOMATED COUNT: 13.3 % (ref 11.5–14.5)
GLUCOSE SERPL-MCNC: 96 MG/DL (ref 65–100)
HCT VFR BLD AUTO: 35.3 % (ref 36.6–50.3)
HGB BLD-MCNC: 11.4 G/DL (ref 12.1–17)
MAGNESIUM SERPL-MCNC: 1.9 MG/DL (ref 1.6–2.4)
MCH RBC QN AUTO: 29.9 PG (ref 26–34)
MCHC RBC AUTO-ENTMCNC: 32.3 G/DL (ref 30–36.5)
MCV RBC AUTO: 92.7 FL (ref 80–99)
PHOSPHATE SERPL-MCNC: 2.9 MG/DL (ref 2.6–4.7)
PLATELET # BLD AUTO: 240 K/UL (ref 150–400)
POTASSIUM SERPL-SCNC: 3.8 MMOL/L (ref 3.5–5.1)
RBC # BLD AUTO: 3.81 M/UL (ref 4.1–5.7)
SODIUM SERPL-SCNC: 139 MMOL/L (ref 136–145)
WBC # BLD AUTO: 9.3 K/UL (ref 4.1–11.1)

## 2017-11-19 PROCEDURE — 80048 BASIC METABOLIC PNL TOTAL CA: CPT | Performed by: HOSPITALIST

## 2017-11-19 PROCEDURE — 74011000250 HC RX REV CODE- 250: Performed by: HOSPITALIST

## 2017-11-19 PROCEDURE — 74011250637 HC RX REV CODE- 250/637: Performed by: HOSPITALIST

## 2017-11-19 PROCEDURE — 65270000029 HC RM PRIVATE

## 2017-11-19 PROCEDURE — 77010033678 HC OXYGEN DAILY

## 2017-11-19 PROCEDURE — 74011250636 HC RX REV CODE- 250/636: Performed by: HOSPITALIST

## 2017-11-19 PROCEDURE — 71010 XR CHEST PORT: CPT

## 2017-11-19 PROCEDURE — 83735 ASSAY OF MAGNESIUM: CPT | Performed by: HOSPITALIST

## 2017-11-19 PROCEDURE — 85027 COMPLETE CBC AUTOMATED: CPT | Performed by: HOSPITALIST

## 2017-11-19 PROCEDURE — 36415 COLL VENOUS BLD VENIPUNCTURE: CPT | Performed by: HOSPITALIST

## 2017-11-19 PROCEDURE — 84100 ASSAY OF PHOSPHORUS: CPT | Performed by: HOSPITALIST

## 2017-11-19 RX ADMIN — OXYCODONE HYDROCHLORIDE 5 MG: 5 TABLET ORAL at 23:04

## 2017-11-19 RX ADMIN — OXYCODONE HYDROCHLORIDE 5 MG: 5 TABLET ORAL at 13:13

## 2017-11-19 RX ADMIN — FOLIC ACID: 5 INJECTION, SOLUTION INTRAMUSCULAR; INTRAVENOUS; SUBCUTANEOUS at 13:13

## 2017-11-19 RX ADMIN — Medication 10 ML: at 22:04

## 2017-11-19 RX ADMIN — OXYCODONE HYDROCHLORIDE 5 MG: 5 TABLET ORAL at 02:28

## 2017-11-19 RX ADMIN — DOCUSATE SODIUM AND SENNOSIDES 1 TABLET: 8.6; 5 TABLET, FILM COATED ORAL at 08:40

## 2017-11-19 RX ADMIN — Medication 10 ML: at 06:36

## 2017-11-19 RX ADMIN — Medication 10 ML: at 13:13

## 2017-11-19 NOTE — PROGRESS NOTES
Patient slept well throughout the night. Medicated for right side pain. Chest tube remain intact and draining serosanguinous. Stood up with assist to void in urinal. Tolerated movement well.

## 2017-11-19 NOTE — PROGRESS NOTES
Problem: Falls - Risk of  Goal: *Absence of Falls  Document Ger Fall Risk and appropriate interventions in the flowsheet.    Outcome: Progressing Towards Goal  Fall Risk Interventions:  Mobility Interventions: Patient to call before getting OOB         Medication Interventions: Patient to call before getting OOB    Elimination Interventions: Call light in reach    History of Falls Interventions: Consult care management for discharge planning, Door open when patient unattended, Investigate reason for fall, Room close to nurse's station

## 2017-11-19 NOTE — PROGRESS NOTES
Hospitalist Progress Note      Hospital summary: 65 y.o man who presented with a left hemopneumothorax after a fall. Assessment/Plan:  Hemopneumothorax: (POA) due to rib fractures after a fall  -continue pain control with IV hydromorphone, PO Percocet, lidocaine patch  -pulmonary toilet  -s/p chest tube placement on 11/17  -thoracic surgery consulted, recs appreciated. CT to water seal today and CXR in AM for possible CT removal    Constipation: resolved  -continue bowel regimen    Alcohol use:  -no s/s of withdrawal; monitor  -continue goodie    Code status: full  DVT prophylaxis: SCDs  Disposition: home when medically appropriate  ----------------------------------------------    CC: chest pain    S: chest pain is much improved, constipation resolved, no n/v/d, no dyspnea. Review of Systems:  Pertinent items are noted in HPI.     O:  Visit Vitals    /78    Pulse 82    Temp 98.6 °F (37 °C)    Resp 20    Ht 5' 10\" (1.778 m)    Wt 68.5 kg (151 lb)    SpO2 96%    BMI 21.67 kg/m2       PHYSICAL EXAM:  Gen: NAD, non-toxic, thin  HEENT: anicteric sclerae, normal conjunctiva, oropharynx clear, MM moist  Neck: supple  Heart: RRR, no MRG, no JVD, no peripheral edema  Lungs: diminished breath sounds in R base, otherwise clear, non-labored respirations, chest tube in place with serosanguinous fluid  Abd: soft, NT, ND, BS+  Extr: warm  Skin: dry, no rash  Neuro: CN II-XII grossly intact, normal speech, moves all extremities  Psych: normal mood, appropriate affect      Intake/Output Summary (Last 24 hours) at 11/19/17 1139  Last data filed at 11/19/17 0955   Gross per 24 hour   Intake              680 ml   Output             3205 ml   Net            -2525 ml        Recent labs & imaging reviewed:  Recent Labs      11/19/17   0356  11/18/17   0339   WBC  9.3  10.2   HGB  11.4*  11.5*   HCT  35.3*  35.6*   PLT  240  237     Recent Labs      11/19/17   0356  11/18/17   0333 11/17/17   1243   NA  139  138  136   K  3.8  4.1  3.8   CL  104  104  101   CO2  27  29  27   BUN  5*  7  9   CREA  0.45*  0.51*  0.65*   GLU  96  100  100   CA  8.7  8.4*  8.5   MG  1.9   --    --    PHOS  2.9   --    --      Recent Labs      11/17/17   1243   SGOT  33   ALT  36   AP  65   TBILI  1.5*   TP  7.7   ALB  3.4*   GLOB  4.3*   LPSE  116     Recent Labs      11/17/17   1243   INR  1.0   PTP  9.8      Lab Results   Component Value Date/Time    Glucose (POC) 97 06/14/2012 08:52 AM       Med list reviewed  Current Facility-Administered Medications   Medication Dose Route Frequency    influenza vaccine 2017-18 (3 yrs+)(PF) (FLUZONE QUAD/FLUARIX QUAD) injection 0.5 mL  0.5 mL IntraMUSCular PRIOR TO DISCHARGE    bisacodyl (DULCOLAX) suppository 10 mg  10 mg Rectal DAILY PRN    sorbitol 70 % solution 30 mL  30 mL Oral DAILY PRN    senna-docusate (PERICOLACE) 8.6-50 mg per tablet 1 Tab  1 Tab Oral DAILY    0.9% sodium chloride 1,000 mL with mvi, adult no. 4 with vit K 10 mL, thiamine 531 mg, folic acid 1 mg infusion   IntraVENous Q24H    lidocaine (LIDODERM) 5 % patch 1 Patch  1 Patch TransDERmal Q24H    ketamine (KETALAR) 50 mg/mL injection 75 mg  75 mg IntraVENous PRN    sodium chloride (NS) flush 5-10 mL  5-10 mL IntraVENous Q8H    sodium chloride (NS) flush 5-10 mL  5-10 mL IntraVENous PRN    acetaminophen (TYLENOL) tablet 650 mg  650 mg Oral Q4H PRN    oxyCODONE IR (ROXICODONE) tablet 5 mg  5 mg Oral Q4H PRN    diphenhydrAMINE (BENADRYL) capsule 25 mg  25 mg Oral Q4H PRN    ondansetron (ZOFRAN) injection 4 mg  4 mg IntraVENous Q4H PRN    HYDROmorphone (PF) (DILAUDID) injection 1 mg  1 mg IntraVENous Q4H PRN       Care Plan discussed with:  Patient/Family and Nurse    Rip Todd MD  Internal Medicine  Date of Service: 11/19/2017

## 2017-11-19 NOTE — PROGRESS NOTES
Bedside shift change report given to [id number] (oncoming nurse) by Salas Calzada RN (offgoing nurse). Report included the following information SBAR, Kardex, Procedure Summary, Intake/Output, Accordion and Recent Results.

## 2017-11-19 NOTE — PROGRESS NOTES
[facility]  [address]  ____________________________________________________________      Admit Date: 2017    POD/HD 2 for hemothorax    Procedure:  * No surgery found *    Subjective:     Patient has no new complaints. Objective:     Blood pressure 104/68, pulse 73, temperature 98.5 °F (36.9 °C), resp. rate 16, height 5' 10\" (1.778 m), weight 151 lb (68.5 kg), SpO2 99 %.     Temp (24hrs), Av.5 °F (36.9 °C), Min:98 °F (36.7 °C), Max:98.9 °F (37.2 °C)                  Physical Exam:  GENERAL: alert, cooperative, no distress, LUNG: clear to auscultation bilaterally, HEART: regular rate and rhythm, S1, S2 normal, no murmur, click, rub or gallop    Incision:none    Chest Tube: no airleak and min drainage    Labs:   Recent Results (from the past 24 hour(s))   CBC W/O DIFF    Collection Time: 17  3:56 AM   Result Value Ref Range    WBC 9.3 4.1 - 11.1 K/uL    RBC 3.81 (L) 4.10 - 5.70 M/uL    HGB 11.4 (L) 12.1 - 17.0 g/dL    HCT 35.3 (L) 36.6 - 50.3 %    MCV 92.7 80.0 - 99.0 FL    MCH 29.9 26.0 - 34.0 PG    MCHC 32.3 30.0 - 36.5 g/dL    RDW 13.3 11.5 - 14.5 %    PLATELET 243 257 - 412 K/uL   METABOLIC PANEL, BASIC    Collection Time: 17  3:56 AM   Result Value Ref Range    Sodium 139 136 - 145 mmol/L    Potassium 3.8 3.5 - 5.1 mmol/L    Chloride 104 97 - 108 mmol/L    CO2 27 21 - 32 mmol/L    Anion gap 8 5 - 15 mmol/L    Glucose 96 65 - 100 mg/dL    BUN 5 (L) 6 - 20 MG/DL    Creatinine 0.45 (L) 0.70 - 1.30 MG/DL    BUN/Creatinine ratio 11 (L) 12 - 20      GFR est AA >60 >60 ml/min/1.73m2    GFR est non-AA >60 >60 ml/min/1.73m2    Calcium 8.7 8.5 - 10.1 MG/DL   MAGNESIUM    Collection Time: 17  3:56 AM   Result Value Ref Range    Magnesium 1.9 1.6 - 2.4 mg/dL   PHOSPHORUS    Collection Time: 17  3:56 AM   Result Value Ref Range    Phosphorus 2.9 2.6 - 4.7 MG/DL       Data Review images and reports reviewed CXR stable post trauma    Assessment:     Principal Problem:    Traumatic hemopneumothorax (11/17/2017)        Plan/Recommendations/Medical Decision Making:     Place CT to water seal today  Will check AM CXR is stable will likely DC CT in AM      Thank you for allowing us to participate in the care of your patient.     Brittni Westfall MD

## 2017-11-19 NOTE — PROGRESS NOTES
Bedside shift change report given to Paola (oncoming nurse) by [facility] (offgoing nurse). Report included the following information SBAR.

## 2017-11-20 ENCOUNTER — APPOINTMENT (OUTPATIENT)
Dept: GENERAL RADIOLOGY | Age: 57
DRG: 200 | End: 2017-11-20
Attending: NURSE PRACTITIONER
Payer: COMMERCIAL

## 2017-11-20 ENCOUNTER — APPOINTMENT (OUTPATIENT)
Dept: GENERAL RADIOLOGY | Age: 57
DRG: 200 | End: 2017-11-20
Attending: THORACIC SURGERY (CARDIOTHORACIC VASCULAR SURGERY)
Payer: COMMERCIAL

## 2017-11-20 VITALS
RESPIRATION RATE: 18 BRPM | WEIGHT: 151 LBS | BODY MASS INDEX: 21.62 KG/M2 | SYSTOLIC BLOOD PRESSURE: 114 MMHG | DIASTOLIC BLOOD PRESSURE: 80 MMHG | HEART RATE: 76 BPM | HEIGHT: 70 IN | TEMPERATURE: 97.8 F | OXYGEN SATURATION: 98 %

## 2017-11-20 PROBLEM — S27.2XXA TRAUMATIC HEMOPNEUMOTHORAX: Status: RESOLVED | Noted: 2017-11-17 | Resolved: 2017-11-20

## 2017-11-20 PROCEDURE — 74011000250 HC RX REV CODE- 250: Performed by: HOSPITALIST

## 2017-11-20 PROCEDURE — 71010 XR CHEST PORT: CPT

## 2017-11-20 PROCEDURE — 71020 XR CHEST PA LAT: CPT

## 2017-11-20 PROCEDURE — 74011250637 HC RX REV CODE- 250/637: Performed by: HOSPITALIST

## 2017-11-20 PROCEDURE — 77010033678 HC OXYGEN DAILY

## 2017-11-20 PROCEDURE — 74011250636 HC RX REV CODE- 250/636: Performed by: HOSPITALIST

## 2017-11-20 RX ORDER — OXYCODONE HYDROCHLORIDE 5 MG/1
5 TABLET ORAL
Qty: 12 TAB | Refills: 0 | Status: SHIPPED | OUTPATIENT
Start: 2017-11-20

## 2017-11-20 RX ORDER — AMOXICILLIN 250 MG
1 CAPSULE ORAL DAILY
Qty: 7 TAB | Refills: 0 | Status: SHIPPED | OUTPATIENT
Start: 2017-11-21

## 2017-11-20 RX ADMIN — Medication 10 ML: at 05:30

## 2017-11-20 RX ADMIN — DOCUSATE SODIUM AND SENNOSIDES 1 TABLET: 8.6; 5 TABLET, FILM COATED ORAL at 08:38

## 2017-11-20 RX ADMIN — OXYCODONE HYDROCHLORIDE 5 MG: 5 TABLET ORAL at 08:38

## 2017-11-20 RX ADMIN — FOLIC ACID: 5 INJECTION, SOLUTION INTRAMUSCULAR; INTRAVENOUS; SUBCUTANEOUS at 10:39

## 2017-11-20 NOTE — PROGRESS NOTES
Bedside shift change report given to Simone Siddiqui RN (oncoming nurse) by Courtney Yarbrough RN (offgoing nurse). Report included the following information SBAR, Kardex, Intake/Output, MAR and Recent Results.

## 2017-11-20 NOTE — PROGRESS NOTES
November 20, 2017       RE: Laura Holly      To Whom It May Concern,    This is to certify that Laura Holly was hospitalized from 11/17/17 to 11/20/17. He may return to work on 11/27/17. Please feel free to contact my office if you have any questions or concerns. Sincerely,  Iris Gonzalez MD  [facility], [facility] Hospitalist Department  [address] [place] [address].   Jovany, [facility]  [phone]

## 2017-11-20 NOTE — DISCHARGE INSTRUCTIONS
Discharge Instructions       PATIENT ID: Holmes Opitz  MRN: [id number]   YOB: 1960    DATE OF ADMISSION: 11/17/2017 12:26 PM    DATE OF DISCHARGE: 11/20/2017    PRIMARY CARE PROVIDER: PROVIDER UNKNOWN     ATTENDING PHYSICIAN: Brigid Johnson MD  DISCHARGING PROVIDER: Brigid Johnson MD    To contact this individual call [phone] and ask the  to page. If unavailable ask to be transferred the Adult Hospitalist Department. DISCHARGE DIAGNOSES hemopneumothorax, rib fractures    CONSULTATIONS: IP CONSULT TO THORACIC SURGERY    PROCEDURES/SURGERIES: * No surgery found *    PENDING TEST RESULTS:   At the time of discharge the following test results are still pending: n/a    FOLLOW UP APPOINTMENTS:   Follow-up Information     Follow up With Details Comments Contact Info    Donovan Tesfaye MD Schedule an appointment as soon as possible for a visit in [address] [place] [address]  [phone]             ADDITIONAL CARE RECOMMENDATIONS:   You were admitted with a collapsed lung and bleeding around the lung. This has resolved. You need to follow-up with your sodctor for a repeat chest x-ray to ensure everything is stable. See additional instructions below. DIET: Regular Diet      DISCHARGE MEDICATIONS:   See Medication Reconciliation Form    · It is important that you take the medication exactly as they are prescribed. · Keep your medication in the bottles provided by the pharmacist and keep a list of the medication names, dosages, and times to be taken in your wallet. · Do not take other medications without consulting your doctor. NOTIFY YOUR PHYSICIAN FOR ANY OF THE FOLLOWING:   Fever over 101 degrees for 24 hours. Chest pain, shortness of breath, fever, chills, nausea, vomiting, diarrhea, change in mentation, falling, weakness, bleeding. Severe pain or pain not relieved by medications.   Or, any other signs or symptoms that you may have questions about. DISPOSITION:  x  Home With:   OT  PT  HH  RN       SNF/Inpatient Rehab/LTAC    Independent/assisted living    Hospice    Other:     CDMP Checked:   Yes x     PROBLEM LIST Updated:  Yes x       Signed:   Bing Armendariz MD  11/20/2017      DISCHARGE INSTRUCTIONS AFTER PNEUMOTHORAX  [facility]  [address]  [facility], [address] [place] [address]  [phone]      Leave the dressing in place for 48 hours    For relief of discomfort:   Take the pain medicine you were given at discharge   Aleve one or two tablets every 12 hrs (with food) along with pain medicine (this can be purchased over the counter at your local pharmacy/drug store) Advil may be substituted. COUGHING:   Coughing is helpful     ACTIVITY:   Exercise has many benefits. It promotes healing, expands your lungs, helps you cough, relaxes your body, tones muscles, lowers blood pressure, and increases your appetite. DO:  1. Walk. Walking is the preferred mode of exercise. Walk everyday and increase the distance each day. Walk outside if weather permits. 2. Climb stairs  3. Ride in a car  4. Perform breathing exercises    DO NOT:  1. Lift heavy objects (8-10)  2. Do heavy yard or housework     CALL THE OFFICE IF YOU DEVELOP:     Increasing pain that is not controlled by pain medicine   Increasing redness or drainage around the incision   Unusual or increasing shortness of breath   Fever greater than 101 degrees   Change in the color of your sputum to yellow or green, especially if you are also have increasing shortness of breath and a fever greater than 101. YOUR MEDICATIONS:        FOLLOW-UP APPOINTMENT:  AFTER DISCHARGE CALL THE OFFICE TO SCHEDULE YOUR VISIT TO SEE US IN 1 Week.  You will need a chest x ray at the time of your visit      Physician Signature

## 2017-11-20 NOTE — PROGRESS NOTES
Problem: Discharge Planning  Goal: *Discharge to safe environment  Outcome: Progressing Towards Goal  Discharge disposition: home.     LEONARDA Dominguez/CRM

## 2017-11-20 NOTE — PROGRESS NOTES
Spiritual Care Partner Volunteer visited patient in Rm 514 on 11/20/17.   Documented by:  Chaplain Pearson MDiv, MS, [address] [facility]  [phone] ([id number])

## 2017-11-20 NOTE — DISCHARGE SUMMARY
Discharge Summary     Patient: Laura Holly MRN: [id number]  SSN: [id number]    YOB: 1960  Age: 65 y.o. Sex: male       Admit Date: 11/17/2017    Discharge Date: 11/20/2017      Admission Diagnoses: Traumatic hemopneumothorax    Discharge Diagnoses:   Traumatic hemopneumothorax  Right rib fractures  Constipation     Discharge Condition: Stable    Hospital Course: Ms. Gavino Honeycutt presented with dyspnea after a fall. He was found to have a left hemopneumothorax and rib fractures. A chest tube was inserted and his symptoms resolved. He was managed conservatively with analgesics and pulmonary toilet. Thoracic surgery was consulted and removed the tube on 11/20/17, d/w Dr. Katherine Rosa; CXR after tube removal looked good and he is stable for discharge. Consults: thoracic surgery    Significant Diagnostic Studies: see above    CXR post chest tube removal: Right rib fractures with right pleural effusion and right middle and lower lobe atelectasis. No definite pneumothorax following chest tube removal.    Disposition: home    S: feels well today, pain is controlled, constipation resolved, no dyspnea, wants to go home. Patient seen before and after chest tube removal.    O:   Visit Vitals    /80 (BP 1 Location: Left arm, BP Patient Position: At rest;Sitting)    Pulse 76    Temp 97.8 °F (36.6 °C)    Resp 18    Ht 5' 10\" (1.778 m)    Wt 68.5 kg (151 lb)    SpO2 98%    BMI 21.67 kg/m2     Gen: NAD  Heart: RRR no MRG  Lungs: diminished breath sounds in R base, otherwise clear  Abd: soft NT ND      Discharge Medications:   Current Discharge Medication List      START taking these medications    Details   oxyCODONE IR (ROXICODONE) 5 mg immediate release tablet Take 1 Tab by mouth every six (6) hours as needed. Max Daily Amount: 20 mg. For pain  Qty: 12 Tab, Refills: 0      senna-docusate (PERICOLACE) 8.6-50 mg per tablet Take 1 Tab by mouth daily.   Qty: 7 Tab, Refills: 0         CONTINUE these medications which have NOT CHANGED    Details   ibuprofen (MOTRIN) 200 mg tablet Take 400 mg by mouth every eight (8) hours as needed for Pain.      multivitamin (ONE A DAY) tablet Take 1 Tab by mouth daily. Follow-up Information     Follow up With Details Comments Contact Info    Abdi Navarro MD Schedule an appointment as soon as possible for a visit in 1 week  [address] [facility], [address] [place] [address] [place] [address]  [phone]              Signed By: Gloria Puentes MD     November 20, 2017      Greater than 30 minutes spent on patient care, counseling, and discharge.

## 2017-11-20 NOTE — PROGRESS NOTES
Emmie reviewed. Patient admitted for traumatic hemopneumothorax and rib fractures. CM met with patient to introduce role of CM and discuss discharge planning. Patient lives with two roommates in a private residence in [place]. Patient is independent with ADLs and uses no DME. Patient is currently employed. Patient has no PCP, and stated he goes to [facility] if he gets sick. CM provided patient with list of [employer] PCPs and encouraged patient to choose a PCP. Confirmed insurance is [employer]. Patient gets medications at [facility] on [address]. Family will transport patient home via car at discharge. CM will be available if needs arise. Care Management Interventions  PCP Verified by CM: No (Patient has no PCP, goes to [facility].)  Mode of Transport at Discharge: Other (see comment) (family.)  Transition of Care Consult (CM Consult): Discharge Planning  MyChart Signup: No  Discharge Durable Medical Equipment: No  Physical Therapy Consult: No  Occupational Therapy Consult: No  Speech Therapy Consult: No  Current Support Network:  Other (Lives with two roomates.)  Confirm Follow Up Transport: Family  Plan discussed with Pt/Family/Caregiver: Yes  Discharge Location  Discharge Placement: Home     LEONARDA Davis/CRM

## 2017-11-20 NOTE — PROGRESS NOTES
Mr. Sarthak Morris is  HD#3 after admission for a traumatic hemopneumothorax and rib fractures. No acute events overnight. Pain better controlled. Afebrile  P 70  -110/70    CT less than 100ml, serous output, no air leak    On exam he is seated in a chair  Alert and oriented  Coarse breath sounds b/l  Dressing c/d/i  R chest wall tender to palpation    CXR- pedning    WBC 9.3  Hgb 11.4  Plts 240    Cr 0.45    Diagnoses  1- Traumatic R hemopneumothorax  2- Multiple displaced R rib fracture    Mr. Sarthak Morris is progressing well. CXR has improved. Plan to remove CT today.

## 2017-11-20 NOTE — PROGRESS NOTES
Reviewed discharge instructions with patient including follow up appointments, signs and symptoms of when to call the doctor, and recommended activity level. Instructed patient to call when his ride arrives so I could take out his IV then and call for a volunteer, patient left with IV still in arm. Attempted to contact patient via home phone number, went straight to voicemail. Called nursing supervisor Malissa Arceo who says we must reach him and see if he is comfortable taking it out himself.  Notified Security who called the police and asked if they would remove the IV, police said no.  ----------

## 2021-05-11 NOTE — PROGRESS NOTES
Hospitalist Progress Note      Hospital summary: 65 y.o man who presented with a left hemopneumothorax after a fall. Assessment/Plan:  Hemopneumothorax: (POA) due to rib fractures after a fall  -continue pain control with Iv hydromorphone, PO Percocet, lidocaine patch  -pulmonary toilet  -s/p chest tube placement on 11/17  -thoracic surgery consulted, recs appreciated    Constipation:  -advance bowel regimen    Alcohol use:  -no s/s of withdrawal; monitor  -start goodie bags    Code status: full  DVT prophylaxis: SCDs  Disposition: home when medically appropriate  ----------------------------------------------    CC: constipation    S: c/o constipation, last bm 4 days ago, no n/v/d, no dys[pnea. Has significant right-sided chest pain with deep inspiration and any movement. Review of Systems:  Pertinent items are noted in HPI.     O:  Visit Vitals    /68    Pulse 81    Temp 98.6 °F (37 °C)    Resp 18    Ht 5' 10\" (1.778 m)    Wt 68.5 kg (151 lb)    SpO2 96%    BMI 21.67 kg/m2       PHYSICAL EXAM:  Gen: NAD, non-toxic, thin  HEENT: anicteric sclerae, normal conjunctiva, oropharynx clear, MM moist  Neck: supple, trachea midline, no adenopathy  Heart: RRR, no MRG, no JVD, no peripheral edema  Lungs: diminished breath sounds in R base, shallow inspirations, some R-sided crackles, non-labored respirations  Abd: soft, NT, ND, BS+  Extr: warm  Skin: dry, no rash  Neuro: CN II-XII grossly intact, normal speech, moves all extremities  Psych: normal mood, appropriate affect      Intake/Output Summary (Last 24 hours) at 11/18/17 1039  Last data filed at 11/18/17 0720   Gross per 24 hour   Intake                0 ml   Output             2060 ml   Net            -2060 ml        Recent labs & imaging reviewed:  Recent Labs      11/18/17   0339  11/17/17   1243   WBC  10.2  12.4*   HGB  11.5*  13.7   HCT  35.6*  41.5   PLT  237  278     Recent Labs      11/18/17   0339  11/17/17 1243   NA  138  136   K  4.1  3.8   CL  104  101   CO2  29  27   BUN  7  9   CREA  0.51*  0.65*   GLU  100  100   CA  8.4*  8.5     Recent Labs      11/17/17   1243   SGOT  33   ALT  36   AP  65   TBILI  1.5*   TP  7.7   ALB  3.4*   GLOB  4.3*   LPSE  116     Recent Labs      11/17/17   1243   INR  1.0   PTP  9.8      Lab Results   Component Value Date/Time    Glucose (POC) 97 06/14/2012 08:52 AM       Med list reviewed  Current Facility-Administered Medications   Medication Dose Route Frequency    bisacodyl (DULCOLAX) suppository 10 mg  10 mg Rectal DAILY PRN    sorbitol 70 % solution 30 mL  30 mL Oral DAILY PRN    senna-docusate (PERICOLACE) 8.6-50 mg per tablet 1 Tab  1 Tab Oral DAILY    ketamine (KETALAR) 50 mg/mL injection 75 mg  75 mg IntraVENous PRN    sodium chloride (NS) flush 5-10 mL  5-10 mL IntraVENous Q8H    sodium chloride (NS) flush 5-10 mL  5-10 mL IntraVENous PRN    0.9% sodium chloride infusion  75 mL/hr IntraVENous CONTINUOUS    acetaminophen (TYLENOL) tablet 650 mg  650 mg Oral Q4H PRN    oxyCODONE IR (ROXICODONE) tablet 5 mg  5 mg Oral Q4H PRN    diphenhydrAMINE (BENADRYL) capsule 25 mg  25 mg Oral Q4H PRN    ondansetron (ZOFRAN) injection 4 mg  4 mg IntraVENous Q4H PRN    HYDROmorphone (PF) (DILAUDID) injection 1 mg  1 mg IntraVENous Q4H PRN       Care Plan discussed with:  Patient/Family and Nurse    Brigid Johnson MD  Internal Medicine  Date of Service: 11/18/2017 NEGATIVE